# Patient Record
(demographics unavailable — no encounter records)

---

## 2025-06-13 NOTE — ASSESSMENT
[FreeTextEntry1] : 89-year-old woman returns interval follow-up 2 weeks status post open reduction droll fixation right olecranon fracture and closed management right distal radius fracture.  Radiographs and physical examination suggest the patient is healing her fractures uneventfully.  Wounds of healed sufficiently for staple removal.  Patient can get this area of her arm wet but would recommend managing her wrist in a short arm cast for another 2 weeks.  Will see her back at that time to convert her to a cock up wrist splint.  During the interim she can weight-bear with her walker.  Would utilize a platform walker.  All question answered the patient and her family satisfaction they agree with the plan.

## 2025-06-13 NOTE — HISTORY OF PRESENT ILLNESS
[de-identified] : 89-year-old woman returns for interval follow-up status post open reduction internal fixation right coronoid fracture closed management right distal radius fracture manipulation on June 1, 2025.  The patient has a past orthopedic history seen for open reduction droll fixation of a reverse obliquity right hip fracture in 2023 treated with a long cephalomedullary nail by a outside orthopedist.  Patient today has complaints mostly of lateral hip pain and right groin pain.  Workup at [facility] did not demonstrate any acute fractures about her pelvis with a CAT scan.  Currently residing in a skilled nursing facility in [place].  Interview carried out with [place]  #[id number]  Past medical history: Denies all active medical issues without clear medical follow-up Hypertension  Medications:  Tylenol as needed Propranolol  NKDA  Social: Walk with the knee extended, non-smoker no EtOH no drug use

## 2025-06-13 NOTE — REASON FOR VISIT
[FreeTextEntry2] : Follow-up ORIF right olecranon fracture closed management right distal radius fracture with manipulation

## 2025-06-13 NOTE — IMAGING
[de-identified] : Pleasant older woman sits comfortably in office in no distress.  Physical examination: Right elbow wrist and digits: Surgical incision clean dry intact no erythema duration or fluctuance.  Able range elbow without any discomfort.  Right wrist is immobilized in a short arm cast.  There is no skin breakdown around the cast.  She is able to range her digits.  No undue swelling about her fingers.  Good capillary refill.  Radiographs: Right elbow (AP, lateral): Well aligned right olecranon fracture.  Hardware soco in place no evidence of loosening.  Right wrist (AP, lateral, oblique): X-ray alignment maintained.  Acceptable alignment.

## 2025-06-24 NOTE — IMAGING
[de-identified] : Tasneem older woman sits comfortably my office no distress.  Physical examination: Right elbow: Surgical incision is clean dry and intact no erythema ration fluctuance.  Elbow motion: 30-95 degrees.  Supination 30 degrees pronation 45 degrees.  Dependent edema.  Able to make a fist.  Normal light sensation Menter fingers.  Right wrist: Short arm cast in good condition.  Mild digital edema about her fingers.  Able to move her fingers.  Good capillary refill.  With cast removed, minimal if any tenderness palpation near the fracture.  No new swelling.  No skin breakdown.  Radiographs: Right elbow (AP, lateral): Well aligned right olecranon fracture with hardware in place.  No evidence of loosening.  Right wrist (AP, lateral): Extra-articular right distal radius fracture.  Acceptable alignment.

## 2025-06-24 NOTE — ASSESSMENT
[FreeTextEntry1] : 89-year-old woman with right olecranon fracture ORIF healing uneventfully 3 weeks from injury and closed management right distal radius fracture.  Wrist fracture is healed sufficiently to be converted to cock-up wrist splint.  We have encouraged her to work on hand and elbow range of motion exercises.  Follow-up in 4 to 6 weeks time for repeat evaluation.  He can start physical therapy for her wrist and elbow in 3 weeks.

## 2025-06-24 NOTE — HISTORY OF PRESENT ILLNESS
[de-identified] : 89-year-old woman returns for interval follow-up status post open reduction droll fixation right olecranon fracture and closed management right distal radius fracture on June 2, 2025.  Patient is eager to have her cast removed.  Denies any fevers or drainage.  Does not require any pain medication.  No sensory complaints.  Happy with elbow surgery.